# Patient Record
Sex: MALE | Race: WHITE | Employment: PART TIME | URBAN - METROPOLITAN AREA
[De-identification: names, ages, dates, MRNs, and addresses within clinical notes are randomized per-mention and may not be internally consistent; named-entity substitution may affect disease eponyms.]

---

## 2020-08-25 ENCOUNTER — HOSPITAL ENCOUNTER (EMERGENCY)
Facility: CLINIC | Age: 20
Discharge: HOME | End: 2020-08-25
Attending: EMERGENCY MEDICINE | Admitting: EMERGENCY MEDICINE
Payer: COMMERCIAL

## 2020-08-25 ENCOUNTER — APPOINTMENT (OUTPATIENT)
Dept: ULTRASOUND IMAGING | Facility: CLINIC | Age: 20
End: 2020-08-25
Attending: EMERGENCY MEDICINE
Payer: COMMERCIAL

## 2020-08-25 VITALS
TEMPERATURE: 98.3 F | SYSTOLIC BLOOD PRESSURE: 136 MMHG | DIASTOLIC BLOOD PRESSURE: 87 MMHG | WEIGHT: 255 LBS | BODY MASS INDEX: 32.73 KG/M2 | RESPIRATION RATE: 8 BRPM | OXYGEN SATURATION: 97 % | HEIGHT: 74 IN | HEART RATE: 93 BPM

## 2020-08-25 DIAGNOSIS — F10.930 ALCOHOL WITHDRAWAL SYNDROME WITHOUT COMPLICATION (H): ICD-10-CM

## 2020-08-25 DIAGNOSIS — R07.89 ATYPICAL CHEST PAIN: ICD-10-CM

## 2020-08-25 DIAGNOSIS — R74.8 ELEVATED LIVER ENZYMES: ICD-10-CM

## 2020-08-25 LAB
ALBUMIN SERPL-MCNC: 4.3 G/DL (ref 3.4–5)
ALP SERPL-CCNC: 116 U/L (ref 40–150)
ALT SERPL W P-5'-P-CCNC: 116 U/L (ref 0–70)
ANION GAP SERPL CALCULATED.3IONS-SCNC: 8 MMOL/L (ref 3–14)
AST SERPL W P-5'-P-CCNC: 64 U/L (ref 0–45)
BASOPHILS # BLD AUTO: 0 10E9/L (ref 0–0.2)
BASOPHILS NFR BLD AUTO: 0.2 %
BILIRUB SERPL-MCNC: 1.5 MG/DL (ref 0.2–1.3)
BUN SERPL-MCNC: 10 MG/DL (ref 7–30)
CALCIUM SERPL-MCNC: 9.3 MG/DL (ref 8.5–10.1)
CHLORIDE SERPL-SCNC: 97 MMOL/L (ref 94–109)
CO2 SERPL-SCNC: 28 MMOL/L (ref 20–32)
CREAT SERPL-MCNC: 0.82 MG/DL (ref 0.66–1.25)
D DIMER PPP FEU-MCNC: <0.3 UG/ML FEU (ref 0–0.5)
DIFFERENTIAL METHOD BLD: ABNORMAL
EOSINOPHIL # BLD AUTO: 0 10E9/L (ref 0–0.7)
EOSINOPHIL NFR BLD AUTO: 0.1 %
ERYTHROCYTE [DISTWIDTH] IN BLOOD BY AUTOMATED COUNT: 12.4 % (ref 10–15)
GFR SERPL CREATININE-BSD FRML MDRD: >90 ML/MIN/{1.73_M2}
GLUCOSE SERPL-MCNC: 118 MG/DL (ref 70–99)
HCT VFR BLD AUTO: 43.6 % (ref 40–53)
HGB BLD-MCNC: 16 G/DL (ref 13.3–17.7)
IMM GRANULOCYTES # BLD: 0 10E9/L (ref 0–0.4)
IMM GRANULOCYTES NFR BLD: 0.2 %
INTERPRETATION ECG - MUSE: NORMAL
LIPASE SERPL-CCNC: 75 U/L (ref 73–393)
LYMPHOCYTES # BLD AUTO: 1.1 10E9/L (ref 0.8–5.3)
LYMPHOCYTES NFR BLD AUTO: 12.8 %
MCH RBC QN AUTO: 32.2 PG (ref 26.5–33)
MCHC RBC AUTO-ENTMCNC: 36.7 G/DL (ref 31.5–36.5)
MCV RBC AUTO: 88 FL (ref 78–100)
MONOCYTES # BLD AUTO: 0.9 10E9/L (ref 0–1.3)
MONOCYTES NFR BLD AUTO: 10.4 %
NEUTROPHILS # BLD AUTO: 6.7 10E9/L (ref 1.6–8.3)
NEUTROPHILS NFR BLD AUTO: 76.3 %
NRBC # BLD AUTO: 0 10*3/UL
NRBC BLD AUTO-RTO: 0 /100
PLATELET # BLD AUTO: 219 10E9/L (ref 150–450)
POTASSIUM SERPL-SCNC: 3.4 MMOL/L (ref 3.4–5.3)
PROT SERPL-MCNC: 7.6 G/DL (ref 6.8–8.8)
RBC # BLD AUTO: 4.97 10E12/L (ref 4.4–5.9)
SODIUM SERPL-SCNC: 133 MMOL/L (ref 133–144)
TSH SERPL DL<=0.005 MIU/L-ACNC: 2.26 MU/L (ref 0.4–4)
WBC # BLD AUTO: 8.8 10E9/L (ref 4–11)

## 2020-08-25 PROCEDURE — 80053 COMPREHEN METABOLIC PANEL: CPT | Performed by: EMERGENCY MEDICINE

## 2020-08-25 PROCEDURE — 96374 THER/PROPH/DIAG INJ IV PUSH: CPT

## 2020-08-25 PROCEDURE — 96361 HYDRATE IV INFUSION ADD-ON: CPT

## 2020-08-25 PROCEDURE — 99285 EMERGENCY DEPT VISIT HI MDM: CPT | Mod: 25

## 2020-08-25 PROCEDURE — 83690 ASSAY OF LIPASE: CPT | Performed by: EMERGENCY MEDICINE

## 2020-08-25 PROCEDURE — C9803 HOPD COVID-19 SPEC COLLECT: HCPCS

## 2020-08-25 PROCEDURE — 93005 ELECTROCARDIOGRAM TRACING: CPT

## 2020-08-25 PROCEDURE — 96375 TX/PRO/DX INJ NEW DRUG ADDON: CPT

## 2020-08-25 PROCEDURE — U0003 INFECTIOUS AGENT DETECTION BY NUCLEIC ACID (DNA OR RNA); SEVERE ACUTE RESPIRATORY SYNDROME CORONAVIRUS 2 (SARS-COV-2) (CORONAVIRUS DISEASE [COVID-19]), AMPLIFIED PROBE TECHNIQUE, MAKING USE OF HIGH THROUGHPUT TECHNOLOGIES AS DESCRIBED BY CMS-2020-01-R: HCPCS | Performed by: EMERGENCY MEDICINE

## 2020-08-25 PROCEDURE — 25000128 H RX IP 250 OP 636: Performed by: EMERGENCY MEDICINE

## 2020-08-25 PROCEDURE — 85025 COMPLETE CBC W/AUTO DIFF WBC: CPT | Performed by: EMERGENCY MEDICINE

## 2020-08-25 PROCEDURE — 85379 FIBRIN DEGRADATION QUANT: CPT | Performed by: EMERGENCY MEDICINE

## 2020-08-25 PROCEDURE — 84443 ASSAY THYROID STIM HORMONE: CPT | Performed by: EMERGENCY MEDICINE

## 2020-08-25 PROCEDURE — 76705 ECHO EXAM OF ABDOMEN: CPT

## 2020-08-25 PROCEDURE — 25800030 ZZH RX IP 258 OP 636: Performed by: EMERGENCY MEDICINE

## 2020-08-25 RX ORDER — SODIUM CHLORIDE 9 MG/ML
INJECTION, SOLUTION INTRAVENOUS CONTINUOUS
Status: DISCONTINUED | OUTPATIENT
Start: 2020-08-25 | End: 2020-08-25 | Stop reason: HOSPADM

## 2020-08-25 RX ORDER — LORAZEPAM 1 MG/1
1 TABLET ORAL EVERY 4 HOURS PRN
Qty: 5 TABLET | Refills: 0 | Status: SHIPPED | OUTPATIENT
Start: 2020-08-25

## 2020-08-25 RX ORDER — LORAZEPAM 2 MG/ML
1 INJECTION INTRAMUSCULAR ONCE
Status: COMPLETED | OUTPATIENT
Start: 2020-08-25 | End: 2020-08-25

## 2020-08-25 RX ORDER — ONDANSETRON 2 MG/ML
4 INJECTION INTRAMUSCULAR; INTRAVENOUS ONCE
Status: COMPLETED | OUTPATIENT
Start: 2020-08-25 | End: 2020-08-25

## 2020-08-25 RX ADMIN — SODIUM CHLORIDE 1000 ML: 9 INJECTION, SOLUTION INTRAVENOUS at 15:48

## 2020-08-25 RX ADMIN — LORAZEPAM 1 MG: 2 INJECTION INTRAMUSCULAR; INTRAVENOUS at 15:48

## 2020-08-25 RX ADMIN — SODIUM CHLORIDE 1000 ML: 9 INJECTION, SOLUTION INTRAVENOUS at 12:55

## 2020-08-25 RX ADMIN — ONDANSETRON 4 MG: 2 INJECTION INTRAMUSCULAR; INTRAVENOUS at 12:27

## 2020-08-25 ASSESSMENT — ENCOUNTER SYMPTOMS
VOMITING: 1
APPETITE CHANGE: 1
ABDOMINAL PAIN: 0
PALPITATIONS: 1
CHEST TIGHTNESS: 1
NAUSEA: 0
HEADACHES: 1
COUGH: 0

## 2020-08-25 ASSESSMENT — MIFFLIN-ST. JEOR: SCORE: 2236.42

## 2020-08-25 NOTE — ED AVS SNAPSHOT
Emergency Department  [address] [place] [id number]  Phone:  [phone]  Fax:  [phone]                                    Eric Wyman   MRN: [id number]    Department:   Emergency Department   Date of Visit:  8/25/2020           After Visit Summary Signature Page    I have received my discharge instructions, and my questions have been answered. I have discussed any challenges I see with this plan with the nurse or doctor.    ..........................................................................................................................................  Patient/Patient Representative Signature      ..........................................................................................................................................  Patient Representative Print Name and Relationship to Patient    ..................................................               ................................................  Date                                   Time    ..........................................................................................................................................  Reviewed by Signature/Title    ...................................................              ..............................................  Date                                               Time          22EPIC Rev 08/18

## 2020-08-25 NOTE — ED PROVIDER NOTES
"History     Chief Complaint:  Chest Pain       The history is provided by the patient.     Eric Wyman is a 20 year old male who presents for evaluation of chest pain, palpitations, headache, and vomiting. The patient reports that last night he began experiencing these symptoms, having one emesis and subsequent loss of appetite and stomach pain, wanting to avoid other emesis. He also experienced some throat tightness over night last night.  Here, he states he is feeling better and denies current chest pain, and reduced chest tightness. He also denies abdominal pain, leg swelling, nausea, cough, changes in sense of taste or smell,     The patient states that he has been drinking more than usual as of late, and has some concern that his symptoms may be related to a panic attack. He has no personal history of pancreatitis. He also denies history of gall bladder issues, DVT, PE, recent known exposure to COVID-19, or recent travel. The patient does report a family history of alcoholism and states that he has been \"drinking more than he should for quite a few years\". He is open to seeking help for his behavior.       Allergies:  No Known Drug Allergies    Medications:   The patient is not currently taking any prescribed medications.     Medical History:   The patient denies any significant past medical history.     Surgical History   History reviewed. No pertinent past surgical history.     Family History:   History reviewed. No pertinent family history.       Social History:  The patient was unaccompanied to the ED.  Smoking Status: Former   Smokeless Tobacco: Never  Alcohol Use: Yes - daily  Drug Use: No       Review of Systems   Constitutional: Positive for appetite change.   HENT:        Positive for throat tightness  Negative for loss of taste or smell   Respiratory: Positive for chest tightness. Negative for cough.    Cardiovascular: Positive for chest pain and palpitations. Negative for leg swelling. " "  Gastrointestinal: Positive for vomiting. Negative for abdominal pain and nausea.   Neurological: Positive for headaches.   All other systems reviewed and are negative.        Physical Exam     Patient Vitals for the past 24 hrs:   BP Temp Temp src Pulse Resp SpO2 Height Weight   08/25/20 1500 (!) 145/92 -- -- 111 10 97 % -- --   08/25/20 1400 (!) 156/90 -- -- 123 (!) 7 99 % -- --   08/25/20 1300 (!) 158/93 -- -- 114 14 97 % -- --   08/25/20 1200 (!) 161/89 -- -- 126 15 98 % -- --   08/25/20 1100 (!) 149/94 -- -- 92 14 96 % -- --   08/25/20 1003 (!) 154/109 98.3  F (36.8  C) Oral 114 22 96 % 1.88 m (6' 2\") 115.7 kg (255 lb)          Physical Exam  Nursing note and vitals reviewed.    Constitutional:  Appears comfortable.    HENT:    Nose normal.  No discharge.      Oral mucosa is moist.  Eyes:    Conjunctivae are normal without injection.     Pupils are equal.  Lymph:  No enlarged or tender cervical or submandibular lymph nodes.   Cardiovascular:  Tachycardic, regular rhythm with normal S1 and S2.      Normal heart sounds and peripheral pulses 2+ and equal.       No murmur or katarzyna.  Pulmonary:  Effort normal and breath sounds clear to auscultation bilaterally.     No respiratory distress.  No stridor.     No wheezes. No rales.     GI:    Soft. No distension and no mass. No tenderness.  No HSM.  Musculoskeletal:  Normal range of motion. No extremity deformity.    Neurological:   Alert and oriented. No focal weakness.  Slight hand tremor.     Exhibits good muscle tone. Coordination normal.      GCS eye subscore is 4. GCS verbal subscore is 5.      GCS motor subscore is 6.   Skin:    Skin is warm and dry. No rash noted. No diaphoresis.      No erythema. No pallor.  No lesions.  Psychiatric:   Behavior is normal. Appropriate mood and affect.     Judgment and thought content normal.         Emergency Department Course     ECG:  ECG taken at 1010, ECG read at 1015  Sinus Tachycardia  Otherwise normal ECG  Rate 114 bpm. " UT interval 164 ms. QRS duration 100 ms. QT/QTc 340/468 ms. P-R-T axes 67 67 62.    Imaging:  Radiology results were communicated with the patient who voiced understanding of the findings.    US Abdomen, limited RUQ only:  IMPRESSION:   1.  Hepatic steatosis.   Reading per radiology.    Laboratory:  Laboratory findings were communicated with the patient who voiced understanding of the findings.    CBC: WBC 8.8, HGB 16.0,   CMP: Glucose 118 (H), (Creatinine 0.82) o/w WNL   TSH with free T4 reflex: 2.26   Lipase: 75   D-Dimer: <0.3     Symptomatic COVID-19 (Coronavirus) PCR by Nasopharyngeal Swab: Pending     Interventions:   1225 Normal Saline 1000 mL IV   1227 Zofran 4 mg IV   1548 Normal Saline 1000 mL IV   1548 Ativan 1 mg IV     Emergency Department Course:    0953 Nursing notes and vitals reviewed.    1010 EKG obtained as noted above.     The patient's nose was swabbed and this sample was sent for COVID testing, findings above.      1030 IV was inserted and blood was drawn for laboratory testing, results above.    1147 I performed an exam of the patient as documented above.     1344 The patient was sent for US while in the emergency department, results above.     1434 Patient rechecked and updated.      1512 Findings and plan explained to the Patient. Patient discharged home with instructions regarding supportive care, medications, and reasons to return. The importance of close follow-up was reviewed. The patient was prescribed as below.    Impression & Plan       Medical Decision Making:  Patient comes in with a number of issues.  One is this atypical type of a chest discomfort that he feels periodically for the last few days.  No cough, no fevers but he felt a little bit chilled last night.  No covert exposure.  He has been drinking heavily for a number of years, he is mom is still an active alcoholic and he has never been through treatment.  He was tachycardic when he came in so I gave him fluids and  ran some blood work.  His comprehensive metabolic panel came back normal other than a bilirubin that is 1.5, ALT of 116 and AST of 64, ALT elevated.  He had had some intermittent epigastric pain so I got a lipase and that is normal.  TSH is normal, CBC is normal and glucose of 118.  With the tachycardia and the chest pain I did get a d-dimer and that is normal.  Elevated liver function test suggested possible liver disease so I did get an ultrasound of his abdomen just showed a fatty liver but otherwise normal.  Even despite fluids he was still little tachycardic and had a slight tremor so I think this was probably alcohol withdrawal.  He was given a milligram of Ativan and his pulse came down from the 120s to the low 100s.  He said he was feeling better.  I do not know what the chest pain is other than possibly musculoskeletal at this time.  His EKG is normal and he has no risk factors for coronary disease.  He wants to get help with his drinking so I am giving him some resources for home.  I am going to give him a small prescription for Ativan to get him through the night.  I want him to see a doctor and get some help with his drinking.  He also has a therapist that he is going to start seeing.  His elevated liver tests are likely due to the drinking.    Push fluids, stay away from alcohol, use the Ativan as directed as needed for alcohol withdrawal.  Set up an appointment with your primary doctor as well as your therapist.  Call one of the numbers and your resources to see about getting help with your drinking.  Ibuprofen as needed.    Covid-19  Eric Wyman was evaluated during a global COVID-19 pandemic, which necessitated consideration that the patient might be at risk for infection with the SARS-CoV-2 virus that causes COVID-19.   Applicable protocols for evaluation were followed during the patient's care.   COVID-19 was considered as part of the patient's evaluation. The plan for testing is:  a test was  obtained during this visit.    Diagnosis:     ICD-10-CM    1. Atypical chest pain  R07.89    2. Alcohol withdrawal syndrome without complication (H)  F10.230    3. Elevated liver enzymes  R74.8         Disposition:  Discharged to home.    Discharge Medications:  New Prescriptions    LORAZEPAM (ATIVAN) 1 MG TABLET    Take 1 tablet (1 mg) by mouth every 4 hours as needed for withdrawal       Scribe Disclosure:  IGayathri, am serving as a scribe at 9:59 AM on 8/25/2020 to document services personally performed by Angela Mann MD based on my observations and the provider's statements to me.      Angela Mann MD  08/25/20 2015

## 2020-08-25 NOTE — DISCHARGE INSTRUCTIONS
Push fluids, stay away from alcohol, use the Ativan as directed as needed for alcohol withdrawal.  Set up an appointment with your primary doctor as well as your therapist.  Call 1 of the numbers and your resources to see about getting help with your drinking.  Ibuprofen as needed.

## 2020-08-26 LAB
SARS-COV-2 RNA SPEC QL NAA+PROBE: NOT DETECTED
SPECIMEN SOURCE: NORMAL

## 2021-04-23 ENCOUNTER — PATIENT OUTREACH (OUTPATIENT)
Dept: CARE COORDINATION | Facility: CLINIC | Age: 21
End: 2021-04-23

## 2021-04-23 DIAGNOSIS — F32.A DEPRESSION: Primary | ICD-10-CM

## 2021-11-16 ENCOUNTER — PATIENT OUTREACH (OUTPATIENT)
Dept: CARE COORDINATION | Facility: CLINIC | Age: 21
End: 2021-11-16
Payer: COMMERCIAL

## 2021-11-16 DIAGNOSIS — F32.A DEPRESSION: Primary | ICD-10-CM

## 2021-12-24 ENCOUNTER — PATIENT OUTREACH (OUTPATIENT)
Dept: CARE COORDINATION | Facility: CLINIC | Age: 21
End: 2021-12-24
Payer: COMMERCIAL

## 2022-01-17 ENCOUNTER — PATIENT OUTREACH (OUTPATIENT)
Dept: CARE COORDINATION | Facility: CLINIC | Age: 22
End: 2022-01-17